# Patient Record
Sex: FEMALE | Race: WHITE | Employment: OTHER | URBAN - METROPOLITAN AREA
[De-identification: names, ages, dates, MRNs, and addresses within clinical notes are randomized per-mention and may not be internally consistent; named-entity substitution may affect disease eponyms.]

---

## 2021-03-05 ENCOUNTER — HOSPITAL ENCOUNTER (OUTPATIENT)
Dept: SURGERY | Age: 61
Discharge: HOME | End: 2021-03-05
Payer: MEDICARE

## 2021-03-05 VITALS
DIASTOLIC BLOOD PRESSURE: 89 MMHG | HEART RATE: 61 BPM | TEMPERATURE: 97.9 F | SYSTOLIC BLOOD PRESSURE: 165 MMHG | RESPIRATION RATE: 18 BRPM | OXYGEN SATURATION: 96 % | HEIGHT: 63 IN | WEIGHT: 178.9 LBS | BODY MASS INDEX: 31.7 KG/M2

## 2021-03-05 LAB
ANION GAP SERPL CALC-SCNC: 2 MMOL/L (ref 7–16)
BACTERIA SPEC CULT: NORMAL
BASOPHILS # BLD: 0.1 K/UL (ref 0–0.2)
BASOPHILS NFR BLD: 1 % (ref 0–2)
BUN SERPL-MCNC: 22 MG/DL (ref 8–23)
CALCIUM SERPL-MCNC: 9.7 MG/DL (ref 8.3–10.4)
CHLORIDE SERPL-SCNC: 109 MMOL/L (ref 98–107)
CO2 SERPL-SCNC: 31 MMOL/L (ref 21–32)
CREAT SERPL-MCNC: 1.09 MG/DL (ref 0.6–1)
DIFFERENTIAL METHOD BLD: ABNORMAL
EOSINOPHIL # BLD: 0.5 K/UL (ref 0–0.8)
EOSINOPHIL NFR BLD: 6 % (ref 0.5–7.8)
ERYTHROCYTE [DISTWIDTH] IN BLOOD BY AUTOMATED COUNT: 14.8 % (ref 11.9–14.6)
GLUCOSE SERPL-MCNC: 90 MG/DL (ref 65–100)
HCT VFR BLD AUTO: 44.4 % (ref 35.8–46.3)
HGB BLD-MCNC: 14.3 G/DL (ref 11.7–15.4)
IMM GRANULOCYTES # BLD AUTO: 0 K/UL (ref 0–0.5)
IMM GRANULOCYTES NFR BLD AUTO: 0 % (ref 0–5)
LYMPHOCYTES # BLD: 3.7 K/UL (ref 0.5–4.6)
LYMPHOCYTES NFR BLD: 44 % (ref 13–44)
MCH RBC QN AUTO: 29 PG (ref 26.1–32.9)
MCHC RBC AUTO-ENTMCNC: 32.2 G/DL (ref 31.4–35)
MCV RBC AUTO: 90.1 FL (ref 79.6–97.8)
MONOCYTES # BLD: 0.8 K/UL (ref 0.1–1.3)
MONOCYTES NFR BLD: 9 % (ref 4–12)
NEUTS SEG # BLD: 3.3 K/UL (ref 1.7–8.2)
NEUTS SEG NFR BLD: 39 % (ref 43–78)
NRBC # BLD: 0 K/UL (ref 0–0.2)
PLATELET # BLD AUTO: 219 K/UL (ref 150–450)
PMV BLD AUTO: 10.5 FL (ref 9.4–12.3)
POTASSIUM SERPL-SCNC: 4.7 MMOL/L (ref 3.5–5.1)
RBC # BLD AUTO: 4.93 M/UL (ref 4.05–5.2)
SERVICE CMNT-IMP: NORMAL
SODIUM SERPL-SCNC: 142 MMOL/L (ref 136–145)
WBC # BLD AUTO: 8.4 K/UL (ref 4.3–11.1)

## 2021-03-05 PROCEDURE — 93005 ELECTROCARDIOGRAM TRACING: CPT | Performed by: ANESTHESIOLOGY

## 2021-03-05 PROCEDURE — 85025 COMPLETE CBC W/AUTO DIFF WBC: CPT

## 2021-03-05 PROCEDURE — 80048 BASIC METABOLIC PNL TOTAL CA: CPT

## 2021-03-05 PROCEDURE — 87641 MR-STAPH DNA AMP PROBE: CPT

## 2021-03-05 PROCEDURE — 36415 COLL VENOUS BLD VENIPUNCTURE: CPT

## 2021-03-05 RX ORDER — GABAPENTIN 600 MG/1
600 TABLET ORAL 3 TIMES DAILY
COMMUNITY

## 2021-03-05 RX ORDER — BUPROPION HYDROCHLORIDE 150 MG/1
150 TABLET, EXTENDED RELEASE ORAL 2 TIMES DAILY
COMMUNITY

## 2021-03-05 RX ORDER — CLOPIDOGREL BISULFATE 75 MG/1
75 TABLET ORAL DAILY
COMMUNITY

## 2021-03-05 RX ORDER — METOPROLOL SUCCINATE 25 MG/1
25 TABLET, EXTENDED RELEASE ORAL DAILY
COMMUNITY

## 2021-03-05 RX ORDER — ATORVASTATIN CALCIUM 40 MG/1
40 TABLET, FILM COATED ORAL DAILY
COMMUNITY

## 2021-03-05 NOTE — PERIOP NOTES
Dr Pastor Jin office called Hafsa Arzate pts Boyfriend and instructed pt to hold Plavix starting now and surgery will need to be rescheduled for 3/16/21. Pt states she will also call office for instructions.

## 2021-03-05 NOTE — PERIOP NOTES
Patient verified name and     Order for consent  found in EHR and matches case posting; patient verified. Type 2 surgery,  assessment complete. Labs per surgeon: cbc,bmp,MRSA, ; results pending  Labs per anesthesia protocol: none; results pending  EKG: 3/5/21 WNL   Pt taking Plavix. States she was not told by office to hold. Call placed to Sherman Sampson at Dr Clarence Clemente office for Plavix instructions. No Covid test listed as MAC anesthesia. Hospital approved surgical skin cleanser and instructions given per hospital policy. Patient provided with and instructed on educational handouts including Guide to Surgery, Pain Management, Hand Hygiene, Blood Transfusion Education, and [facility] Brochure. Patient answered medical/surgical history questions at their best of ability. All prior to admission medications documented in [facility]. Original medication prescription bottle not visualized during patient appointment. Patient instructed to hold all vitamins 7 days prior to surgery and NSAIDS 5 days prior to surgery, patient verbalized understanding. Patient teach back successful and patient demonstrates knowledge of instructions.

## 2021-03-05 NOTE — PERIOP NOTES
PLEASE CONTINUE TAKING ALL PRESCRIPTION MEDICATIONS UP TO THE DAY OF SURGERY UNLESS OTHERWISE DIRECTED BELOW. DISCONTINUE all vitamins and supplements 7 days prior to surgery. DISCONTINUE Non-Steriodal Anti-Inflammatory (NSAIDS) such as Advil and Aleve 5 days prior to surgery. Home Medications to take  the day of surgery    Atorvastatin, Bupropion, Gabapentin, Metoprolol           Home Medications   to Hold   Clopidogrel/ Plavix as directed         Comments         *Visitor policy of 1 visitor per patient discussed. Please do not bring home medications with you on the day of surgery unless otherwise directed by your nurse. If you are instructed to bring home medications, please give them to your nurse as they will be administered by the nursing staff. If you have any questions, please call [facility] [phone] or [facility] [phone]. A copy of this note was provided to the patient for reference.

## 2021-03-06 LAB
ATRIAL RATE: 54 BPM
CALCULATED P AXIS, ECG09: 76 DEGREES
CALCULATED R AXIS, ECG10: 64 DEGREES
CALCULATED T AXIS, ECG11: 67 DEGREES
DIAGNOSIS, 93000: NORMAL
P-R INTERVAL, ECG05: 158 MS
Q-T INTERVAL, ECG07: 442 MS
QRS DURATION, ECG06: 72 MS
QTC CALCULATION (BEZET), ECG08: 419 MS
VENTRICULAR RATE, ECG03: 54 BPM

## 2021-04-12 ENCOUNTER — ANESTHESIA EVENT (OUTPATIENT)
Dept: SURGERY | Age: 61
End: 2021-04-12
Payer: MEDICARE

## 2021-04-12 NOTE — CONSULTS
[address] [facility] 
CONSULTATION Name:  Irene Kyle 
MR#:  [id number] :  1960 ACCOUNT #:  [de-identified] DATE OF SERVICE:  2021 DATE OF SURGERY:  2021 HISTORY:  Ms. Alivia Hernández is a 79-year-old female with an  Medtronic spinal cord stimulator generator. She presents for replacement of her  battery. PAST MEDICAL HISTORY 1.  Stroke. 2.  COPD. PAST SURGICAL HISTORY: 
1.  Lumbar laminectomy x5. 
2.  Medtronic spinal cord stimulator implantation. MEDICATIONS: 
1. Wellbutrin. 2.  Metoprolol. 3.  Atorvastatin. 4.  Symbicort. 5.  Gabapentin. ALLERGIES: 
1. MORPHINE. 2.  LATEX. 3.  ASPIRIN. SOCIAL HISTORY:  Denies smoking or alcohol abuse. FAMILY HISTORY:  Noncontributory. REVIEW OF SYSTEMS:  Noncontributory. PHYSICAL EXAMINATION: 
GENERAL APPEARANCE:  Very pleasant middle-aged white female sitting in a chair in no acute distress. VITAL SIGNS:  Afebrile. Vital signs stable. CHEST:  Clear. HEART:  Regular rate and rhythm without murmur. ABDOMEN:  Benign. EXTREMITIES:  No clubbing, cyanosis or edema. MUSCULOSKELETAL:  Exam revealed no tenderness over the cervical, thoracic or lumbar spinous processes or paraspinous muscles. No sacroiliac joint tenderness and well-healed lumbar scars from previous back surgery. Spinal cord stimulator generator is present in the right gluteal region. NEUROLOGIC:  Exam alert and oriented x3. Cranial nerves II-XII grossly intact. Sensory -  normal light touch throughout the right leg. Diminished light touch in the left leg. Motor -  Normal strength throughout both legs. Reflexes 1+ in both patella and Achilles. Gait was unremarkable. SUMMARY:  This is a pleasant 79-year-old female with an  Medtronic spinal cord stimulator generator who presents for replacement of her  battery.  
 
PLAN:  The patient will be taken to the operating room tomorrow for replacement of her  Medtronic spinal cord stimulator generator under MAC anesthesia. Hannah Agarwal MD 
 
 
[id number] 
D:  04/12/2021 19:09 
T:  04/12/2021 19:40 
JOB #:  [id number]

## 2021-04-13 ENCOUNTER — HOSPITAL ENCOUNTER (OUTPATIENT)
Age: 61
Setting detail: OUTPATIENT SURGERY
Discharge: HOME | End: 2021-04-13
Attending: ANESTHESIOLOGY | Admitting: ANESTHESIOLOGY
Payer: MEDICARE

## 2021-04-13 ENCOUNTER — ANESTHESIA (OUTPATIENT)
Dept: SURGERY | Age: 61
End: 2021-04-13
Payer: MEDICARE

## 2021-04-13 ENCOUNTER — APPOINTMENT (OUTPATIENT)
Dept: GENERAL RADIOLOGY | Age: 61
End: 2021-04-13
Attending: ANESTHESIOLOGY
Payer: MEDICARE

## 2021-04-13 VITALS
BODY MASS INDEX: 29.77 KG/M2 | HEART RATE: 70 BPM | TEMPERATURE: 98 F | WEIGHT: 168 LBS | DIASTOLIC BLOOD PRESSURE: 67 MMHG | OXYGEN SATURATION: 98 % | SYSTOLIC BLOOD PRESSURE: 125 MMHG | RESPIRATION RATE: 16 BRPM | HEIGHT: 63 IN

## 2021-04-13 PROCEDURE — 77030008462 HC STPLR SKN PROX J&J -A: Performed by: ANESTHESIOLOGY

## 2021-04-13 PROCEDURE — C1787 PATIENT PROGR, NEUROSTIM: HCPCS | Performed by: ANESTHESIOLOGY

## 2021-04-13 PROCEDURE — 77030042356: Performed by: ANESTHESIOLOGY

## 2021-04-13 PROCEDURE — 77030020274 HC MISC IMPL ORTHOPEDIC: Performed by: ANESTHESIOLOGY

## 2021-04-13 PROCEDURE — 77030031139 HC SUT VCRL2 J&J -A: Performed by: ANESTHESIOLOGY

## 2021-04-13 PROCEDURE — 2709999900 HC NON-CHARGEABLE SUPPLY: Performed by: ANESTHESIOLOGY

## 2021-04-13 PROCEDURE — 74011000250 HC RX REV CODE- 250: Performed by: ANESTHESIOLOGY

## 2021-04-13 PROCEDURE — 76210000063 HC OR PH I REC FIRST 0.5 HR: Performed by: ANESTHESIOLOGY

## 2021-04-13 PROCEDURE — 74011000250 HC RX REV CODE- 250: Performed by: REGISTERED NURSE

## 2021-04-13 PROCEDURE — 77030038930 HC PRRGR RECHRG MEDT -H1: Performed by: ANESTHESIOLOGY

## 2021-04-13 PROCEDURE — 74011250636 HC RX REV CODE- 250/636: Performed by: ANESTHESIOLOGY

## 2021-04-13 PROCEDURE — 76010000154 HC OR TIME FIRST 0.5 HR: Performed by: ANESTHESIOLOGY

## 2021-04-13 PROCEDURE — 74011250636 HC RX REV CODE- 250/636: Performed by: REGISTERED NURSE

## 2021-04-13 PROCEDURE — 76210000020 HC REC RM PH II FIRST 0.5 HR: Performed by: ANESTHESIOLOGY

## 2021-04-13 PROCEDURE — 76060000032 HC ANESTHESIA 0.5 TO 1 HR: Performed by: ANESTHESIOLOGY

## 2021-04-13 DEVICE — IMPLANTABLE DEVICE: Type: IMPLANTABLE DEVICE | Site: BUTTOCKS | Status: FUNCTIONAL

## 2021-04-13 RX ORDER — CEFAZOLIN SODIUM/WATER 2 G/20 ML
2 SYRINGE (ML) INTRAVENOUS ONCE
Status: COMPLETED | OUTPATIENT
Start: 2021-04-13 | End: 2021-04-13

## 2021-04-13 RX ORDER — OXYCODONE HYDROCHLORIDE 5 MG/1
10 TABLET ORAL
Status: DISCONTINUED | OUTPATIENT
Start: 2021-04-13 | End: 2021-04-13 | Stop reason: HOSPADM

## 2021-04-13 RX ORDER — SODIUM CHLORIDE, SODIUM LACTATE, POTASSIUM CHLORIDE, CALCIUM CHLORIDE 600; 310; 30; 20 MG/100ML; MG/100ML; MG/100ML; MG/100ML
100 INJECTION, SOLUTION INTRAVENOUS CONTINUOUS
Status: DISCONTINUED | OUTPATIENT
Start: 2021-04-13 | End: 2021-04-13 | Stop reason: HOSPADM

## 2021-04-13 RX ORDER — LIDOCAINE HYDROCHLORIDE 10 MG/ML
0.3 INJECTION INFILTRATION; PERINEURAL ONCE
Status: DISCONTINUED | OUTPATIENT
Start: 2021-04-13 | End: 2021-04-13 | Stop reason: HOSPADM

## 2021-04-13 RX ORDER — HYDROMORPHONE HYDROCHLORIDE 1 MG/ML
0.5 INJECTION, SOLUTION INTRAMUSCULAR; INTRAVENOUS; SUBCUTANEOUS
Status: DISCONTINUED | OUTPATIENT
Start: 2021-04-13 | End: 2021-04-13 | Stop reason: HOSPADM

## 2021-04-13 RX ORDER — MIDAZOLAM HYDROCHLORIDE 1 MG/ML
2 INJECTION, SOLUTION INTRAMUSCULAR; INTRAVENOUS
Status: DISCONTINUED | OUTPATIENT
Start: 2021-04-13 | End: 2021-04-13 | Stop reason: HOSPADM

## 2021-04-13 RX ORDER — PROPOFOL 10 MG/ML
INJECTION, EMULSION INTRAVENOUS AS NEEDED
Status: DISCONTINUED | OUTPATIENT
Start: 2021-04-13 | End: 2021-04-13 | Stop reason: HOSPADM

## 2021-04-13 RX ORDER — LIDOCAINE HYDROCHLORIDE 20 MG/ML
INJECTION, SOLUTION EPIDURAL; INFILTRATION; INTRACAUDAL; PERINEURAL AS NEEDED
Status: DISCONTINUED | OUTPATIENT
Start: 2021-04-13 | End: 2021-04-13 | Stop reason: HOSPADM

## 2021-04-13 RX ORDER — VANCOMYCIN HYDROCHLORIDE 1 G/20ML
INJECTION, POWDER, LYOPHILIZED, FOR SOLUTION INTRAVENOUS AS NEEDED
Status: DISCONTINUED | OUTPATIENT
Start: 2021-04-13 | End: 2021-04-13 | Stop reason: HOSPADM

## 2021-04-13 RX ORDER — LIDOCAINE HYDROCHLORIDE AND EPINEPHRINE 10; 10 MG/ML; UG/ML
INJECTION, SOLUTION INFILTRATION; PERINEURAL AS NEEDED
Status: DISCONTINUED | OUTPATIENT
Start: 2021-04-13 | End: 2021-04-13 | Stop reason: HOSPADM

## 2021-04-13 RX ORDER — PROPOFOL 10 MG/ML
INJECTION, EMULSION INTRAVENOUS
Status: DISCONTINUED | OUTPATIENT
Start: 2021-04-13 | End: 2021-04-13 | Stop reason: HOSPADM

## 2021-04-13 RX ADMIN — PROPOFOL 50 MG: 10 INJECTION, EMULSION INTRAVENOUS at 07:46

## 2021-04-13 RX ADMIN — CEFAZOLIN SODIUM 2 G: 10 INJECTION, POWDER, FOR SOLUTION INTRAVENOUS at 07:14

## 2021-04-13 RX ADMIN — PROPOFOL 100 MCG/KG/MIN: 10 INJECTION, EMULSION INTRAVENOUS at 07:46

## 2021-04-13 RX ADMIN — LIDOCAINE HYDROCHLORIDE 50 MG: 20 INJECTION, SOLUTION EPIDURAL; INFILTRATION; INTRACAUDAL; PERINEURAL at 07:46

## 2021-04-13 RX ADMIN — SODIUM CHLORIDE, SODIUM LACTATE, POTASSIUM CHLORIDE, AND CALCIUM CHLORIDE 100 ML/HR: 600; 310; 30; 20 INJECTION, SOLUTION INTRAVENOUS at 05:45

## 2021-04-13 RX ADMIN — CEFAZOLIN 2 G: 1 INJECTION, POWDER, FOR SOLUTION INTRAVENOUS at 07:48

## 2021-04-13 NOTE — H&P
[address] [place] [address]  HISTORY AND PHYSICAL    Name:  Ld Mccabe  MR#:  [id number]  :  1960  ACCOUNT #:  [de-identified]  DATE OF SERVICE:  2021    DATE OF SURGERY:  2021    HISTORY:  Ms. Madeleine Lucas is a 68-year-old female with an  Medtronic spinal cord stimulator generator. She presents for replacement of her  battery. PAST MEDICAL HISTORY  1.  Stroke. 2.  COPD. PAST SURGICAL HISTORY:  1.  Lumbar laminectomy x5.  2.  Medtronic spinal cord stimulator implantation. MEDICATIONS:  1. Wellbutrin. 2.  Metoprolol. 3.  Atorvastatin. 4.  Symbicort. 5.  Gabapentin. ALLERGIES:  1. MORPHINE. 2.  LATEX. 3.  ASPIRIN. SOCIAL HISTORY:  Denies smoking or alcohol abuse. FAMILY HISTORY:  Noncontributory. REVIEW OF SYSTEMS:  Noncontributory. PHYSICAL EXAMINATION:  GENERAL APPEARANCE:  Very pleasant middle-aged white female sitting in a chair in no acute distress. VITAL SIGNS:  Afebrile. Vital signs stable. CHEST:  Clear. HEART:  Regular rate and rhythm without murmur. ABDOMEN:  Benign. EXTREMITIES:  No clubbing, cyanosis or edema. MUSCULOSKELETAL:  Exam revealed no tenderness over the cervical, thoracic or lumbar spinous processes or paraspinous muscles. No sacroiliac joint tenderness and well-healed lumbar scars from previous back surgery. Spinal cord stimulator generator is present in the right gluteal region. NEUROLOGIC:  Exam alert and oriented x3. Cranial nerves II-XII grossly intact. Sensory -  normal light touch throughout the right leg. Diminished light touch in the left leg. Motor -  Normal strength throughout both legs. Reflexes 1+ in both patella and Achilles. Gait was unremarkable. SUMMARY:  This is a pleasant 68-year-old female with an  Medtronic spinal cord stimulator generator who presents for replacement of her  battery.     PLAN:  The patient will be taken to the operating room tomorrow for replacement of her  Medtronic spinal cord stimulator generator under MAC anesthesia.         Negrita Joseph MD      [id number]  D:  2021 19:09  T:  2021 19:40  JOB #:  [id number]

## 2021-04-13 NOTE — ANESTHESIA POSTPROCEDURE EVALUATION
Procedure(s):  SPINAL CORD STIMULATOR  BATTERY CHANGE.    total IV anesthesia    Anesthesia Post Evaluation      Multimodal analgesia: multimodal analgesia used between 6 hours prior to anesthesia start to PACU discharge  Patient location during evaluation: PACU  Patient participation: complete - patient participated  Level of consciousness: awake  Pain management: adequate  Airway patency: patent  Anesthetic complications: no  Cardiovascular status: acceptable  Respiratory status: acceptable  Hydration status: acceptable  Post anesthesia nausea and vomiting:  none      INITIAL Post-op Vital signs:   Vitals Value Taken Time   /67 04/13/21 0825   Temp 36.5 °C (97.7 °F) 04/13/21 0810   Pulse 72 04/13/21 0855   Resp 16 04/13/21 0825   SpO2 96 % 04/13/21 0855   Vitals shown include unvalidated device data.

## 2021-04-13 NOTE — BRIEF OP NOTE
Brief Postoperative Note    Patient: Zhanna Mendez  YOB: 1960  MRN: [id number]    Date of Procedure: 2021     Pre-Op Diagnosis: Postlaminectomy syndrome [M96.1]    Post-Op Diagnosis: same     Procedure(s):  SPINAL CORD STIMULATOR  BATTERY CHANGE    Surgeon(s):  Yuniel Morris MD    Surgical Assistant: None    Anesthesia: MAC     Estimated Blood Loss (mL): none    Complications: none    Specimens:  battery     Implants:   Implant Name Type Inv.  Item Serial No.  Lot No. LRB No. Used Action   DEVICE NEUROSTIMULATOR 139CC [id number] [address] [place] [address] - [id number]  DEVICE NEUROSTIMULATOR 139CC [id number] [address] [place] [address] [id number] [employer] [place] [employer]  Right 1 Implanted       Drains: none    Findings: none    Electronically Signed by Lily Zambrano MD on 2021 at 9:15 AM

## 2021-04-13 NOTE — PROCEDURES
[address] [facility]  PROCEDURE NOTE    Name:  Lauri Raya  MR#:  [id number]  :  1960  ACCOUNT #:  [de-identified]  DATE OF SERVICE:  2021    PREOPERATIVE DIAGNOSIS:   Medtronic spinal cord stimulator generator. POSTOPERATIVE DIAGNOSIS:   Medtronic spinal cord stimulator generator. PROCEDURE PERFORMED:  1. Replacement of  spinal cord stimulator generator. 2.  Complex spinal cord stimulator programming. SURGEON:  Balta Florian MD    ASSISTANT:  none    ANESTHESIA:  Monitored anesthesia care. ESTIMATED BLOOD LOSS:  None. SPECIMENS REMOVED:  none    COMPLICATIONS:  None. IMPLANTS:  none    FLUIDS:  None. CONDITION:  Stable. INDICATIONS:  The patient is a 43-year-old female with an  Medtronic spinal cord stimulator generator. She presents today for removal and replacement of the generator with a new battery. PROCEDURE:  After informed consent was obtained, a 22-gauge IV was placed in the right arm. The 2 g of Ancef was given for antibiotic prophylaxis and she was taken to the operating room and positioned prone. Monitors were applied. Vital signs were stable. The lower back and buttock were prepped and draped in the usual sterile fashion. The skin over her generator was infiltrated with 2% lidocaine with epinephrine. Incision was made with a 15-blade. Blunt dissection was carried out down to the generator. The generator was easily removed and disconnected from the lead extensions. The wound was irrigated with vancomycin solution. Hemostasis was achieved with electrocautery. The battery was then connected to the lead extension and implanted. Integrity was checked by the Med[employer] representative. The wound was closed with a 2-0 interrupted Vicryl suture and staples. The wound was dressed with gauze and OpSite. She tolerated the entire procedure well. There were no complications.   She was transferred to the recovery room in satisfactory condition. PLANS:  1. She underwent complex spinal cord stimulator programming in the recovery room with excellent results. 2.  She was given a prescription for Duricef 500 mg b.i.d. for 7 days for antibiotic prophylaxis. I will see her back in about two weeks for removal of her helga.       Frankie Giron MD      [id number]  D:  04/13/2021 8:07  T:  04/13/2021 8:46  JOB #:  [id number]

## 2021-04-13 NOTE — PROGRESS NOTES
Spiritual Care visit. Initial Visit, Pre Surgery Consult. Visit and prayer before patient goes to surgery.     Visit by Rin El M.Ed., Th.B. ,Staff

## 2021-04-13 NOTE — DISCHARGE INSTRUCTIONS
Pain Management Aftercare    Procedure: pain pump implantACTIVITY  · As tolerated and as directed by your doctor. · Bathe or shower as directed by your doctor. DIET  · Clear liquids until no nausea or vomiting; then light diet for the first day. · Advance to regular diet on second day, unless your doctor orders otherwise. · If nausea and vomiting continues, call your doctor. PAIN  · Take pain medication as directed by your doctor. · Call your doctor if pain is NOT relieved by medication. · DO NOT take aspirin of blood thinners unless directed by your doctor. DRESSING CARE       CALL YOUR DOCTOR IF   · Excessive bleeding that does not stop after holding pressure over the area  · Temperature of 101 degrees F or above  · Excessive redness, swelling or bruising, and/ or green or yellow, smelly discharge from incision    AFTER ANESTHESIA   · For the first 24 hours: DO NOT Drive, Drink alcoholic beverages, or Make important decisions. · Be aware of dizziness following anesthesia and while taking pain medication. APPOINTMENT DATE/ TIME    YOUR DOCTOR'S PHONE NUMBER       DISCHARGE SUMMARY from Nurse    PATIENT INSTRUCTIONS:    After general anesthesia or intravenous sedation, for 24 hours or while taking prescription Narcotics:  · Limit your activities  · Do not drive and operate hazardous machinery  · Do not make important personal or business decisions  · Do  not drink alcoholic beverages  · If you have not urinated within 8 hours after discharge, please contact your surgeon on call. *  Please give a list of your current medications to your Primary Care Provider. *  Please update this list whenever your medications are discontinued, doses are      changed, or new medications (including over-the-counter products) are added. *  Please carry medication information at all times in case of emergency situations.       These are general instructions for a healthy lifestyle:    No smoking/ No tobacco products/ Avoid exposure to second hand smoke    Surgeon General's Warning:  Quitting smoking now greatly reduces serious risk to your health. Obesity, smoking, and sedentary lifestyle greatly increases your risk for illness    A healthy diet, regular physical exercise & weight monitoring are important for maintaining a healthy lifestyle    You may be retaining fluid if you have a history of heart failure or if you experience any of the following symptoms:  Weight gain of 3 pounds or more overnight or 5 pounds in a week, increased swelling in our hands or feet or shortness of breath while lying flat in bed. Please call your doctor as soon as you notice any of these symptoms; do not wait until your next office visit. Recognize signs and symptoms of STROKE:    F-face looks uneven    A-arms unable to move or move unevenly    S-speech slurred or non-existent    T-time-call 911 as soon as signs and symptoms begin-DO NOT go       Back to bed or wait to see if you get better-TIME IS BRAIN. Common Side Effects that may last 8-12 hours:  Drowsiness  Slurred speech  Dizziness  Poor balance  Blurred vision     Hangover effect (headache, upset stomach, etc.)    Aftercare Instructions: You must have a responsible adult drive you home. Do not drive a car or operate equipment for at least 12 hours. Do not take any new medications for at least 24 hours unless your doctor has prescribed them and he is aware that you are taking them. Do not drink any alcoholic beverages until the next day. Advance to your normal diet as tolerated. Expect soreness at the injection site that will improve over the next 24 hours. Avoid strenuous exercise or heavy lifting. Pre-injection activities may be resumed tomorrow (unless instructed otherwise by your doctor).     Notify your doctor immediately if any of the following symptoms occur:  Severe pain at the injection site  Bleeding or drainage from injection site  Fever 101 degrees F or greater  New or increased weakness/numbness of extremities that does not resolve  Severe headache that disappears or gets better when you lie down  Breathing difficulty  Skin rash  Vomiting  Seizures  Unusual drowsiness    Doctor's Phone Number: [phone]    Follow-up Care:  Appointment with Dr. Angélica Dupree ([address] [place] [address] name) on *** (date of appointment).

## 2021-04-13 NOTE — ANESTHESIA PREPROCEDURE EVALUATION
Relevant Problems   No relevant active problems       Anesthetic History               Review of Systems / Medical History  Patient summary reviewed and pertinent labs reviewed    Pulmonary          Smoker  Asthma     Comments: Suspect COPD   Neuro/Psych       CVA  TIA     Cardiovascular    Hypertension                   GI/Hepatic/Renal  Within defined limits              Endo/Other  Within defined limits           Other Findings              Physical Exam    Airway  Mallampati: II  TM Distance: 4 - 6 cm  Neck ROM: normal range of motion   Mouth opening: Normal     Cardiovascular    Rhythm: regular  Rate: normal         Dental    Dentition: Caps/crowns     Pulmonary  Breath sounds clear to auscultation               Abdominal         Other Findings            Anesthetic Plan    ASA: 3  Anesthesia type: total IV anesthesia          Induction: Intravenous  Anesthetic plan and risks discussed with: Patient

## (undated) DEVICE — TRAY PREP DRY W/ PREM GLV 2 APPL 6 SPNG 2 UNDPD 1 OVERWRAP

## (undated) DEVICE — REM POLYHESIVE ADULT PATIENT RETURN ELECTRODE: Brand: VALLEYLAB

## (undated) DEVICE — BUTTON SWITCH PENCIL BLADE ELECTRODE, HOLSTER: Brand: EDGE

## (undated) DEVICE — GAUZE,SPONGE,8"X4",12PLY,XRAY,STRL,LF: Brand: MEDLINE

## (undated) DEVICE — DRAPE SHT 3 QTR PROXIMA 53X77 --

## (undated) DEVICE — 3M™ TEGADERM™ TRANSPARENT FILM DRESSING FRAME STYLE, 1628, 6 IN X 8 IN (15 CM X 20 CM), 10/CT 8CT/CASE: Brand: 3M™ TEGADERM™

## (undated) DEVICE — Z DISCONTINUED USE 2219801 STAPLER SKIN REG CRWN L5.7MM LEG L3.9MM WIRE DIA0.53MM PROX

## (undated) DEVICE — SYR 10ML LUER LOK 1/5ML GRAD --

## (undated) DEVICE — SYSTEM RECHARGING NEUROSTIMULATOR RECHRG BTTRY PK PWR SUPL

## (undated) DEVICE — NEEDLE HYPO 25GA L1.5IN BLU POLYPR HUB S STL REG BVL STR

## (undated) DEVICE — GAUZE,SPONGE,4"X4",16PLY,STRL,LF,10/TRAY: Brand: MEDLINE

## (undated) DEVICE — SUTURE VCRL SZ 2-0 L36IN ABSRB UD L36MM CT-1 1/2 CIR J945H

## (undated) DEVICE — SURGICAL PROCEDURE PACK BASIC ST FRANCIS

## (undated) DEVICE — MANIFOLD, SINGLE PORT, NEP2, REPROCESSED: Brand: MEDLINE RENEWAL

## (undated) DEVICE — CONTROLLER NEUROSTIMULATOR HND HELD PRGMR WIRELESS PTM FOR

## (undated) DEVICE — SHEET, T, LAPAROTOMY, STERILE: Brand: MEDLINE